# Patient Record
(demographics unavailable — no encounter records)

---

## 2025-02-04 NOTE — HISTORY OF PRESENT ILLNESS
[de-identified] : Ms. Roberts is a 79 y/o F with a PMHx of HTN, HLD and hypothyroidism who presents to the clinic for followup visit. Pt reports to have had a GI virus last week on 1/27/25. Woke up in AM with 4x episodes of diarrhea that was soft, loose, and brown with 4x episodes of vomiting. Pt states her spouse had similar sx which self resolved that day. Pt also endorses a history of "thigh weakness" which pt develops when pt believes her hypothyroidism is uncontrolled. Pt states she has had hypothyroidism since 1984 and recognizes her thigh weakness as one of the symptoms she experiences when her Synthroid dose is too low. Apart from this, pt denies any sx at this time including fevers, fatigue, chills, n/v/d, abdominal pain, dysuria, hematuria, hematochezia, melena. Pt has questions related to her medications.   On first comprehensive visit with us at [facility] in 6/2024, pt was discontinued on her Coreg 12.5mg Qd dosing in favor of 6.25mg BID dosing which pt reports compliance to. She was wondering if she may go back to her once daily dosing citing easier compliance to once daily dosing. Pt was also wondering if her Synthroid dosing may need to be increased back to her 125mcg dosing which she has been taking up until 2 years ago when it was decreased to 100mcg daily. She would also like refills on her combination Losartan-HCTZ tablet at this time. UTD with mammogram (due 12/2025 or 1/2026) and Flu shot.

## 2025-02-04 NOTE — PLAN
[FreeTextEntry1] : - Discussed with Dr. Edwards  - See plan under Hypothyroidism for discussion on dosing adjustment of Synthroid  - F/u with us in 8 weeks

## 2025-02-04 NOTE — ASSESSMENT
[FreeTextEntry1] : Ms. Roberts is a 79 y/o F with a PMHx of HTN, HLD and hypothyroidism who presents to the clinic for followup visit

## 2025-02-04 NOTE — END OF VISIT
[] : Resident [FreeTextEntry3] : Hypothyroid with some recognizable symptoms as per pt. Check TSH. Adjust meds according to level.  If TSH is still within the ref range, can still adjust the dosing by suppressing TSH to the LOWER limit, i.e. increase the LT4 dosing slightly.  Pt verbalized understanding of the plan.

## 2025-02-04 NOTE — HISTORY OF PRESENT ILLNESS
[de-identified] : Ms. Roberts is a 79 y/o F with a PMHx of HTN, HLD and hypothyroidism who presents to the clinic for followup visit. Pt reports to have had a GI virus last week on 1/27/25. Woke up in AM with 4x episodes of diarrhea that was soft, loose, and brown with 4x episodes of vomiting. Pt states her spouse had similar sx which self resolved that day. Pt also endorses a history of "thigh weakness" which pt develops when pt believes her hypothyroidism is uncontrolled. Pt states she has had hypothyroidism since 1984 and recognizes her thigh weakness as one of the symptoms she experiences when her Synthroid dose is too low. Apart from this, pt denies any sx at this time including fevers, fatigue, chills, n/v/d, abdominal pain, dysuria, hematuria, hematochezia, melena. Pt has questions related to her medications.   On first comprehensive visit with us at [facility] in 6/2024, pt was discontinued on her Coreg 12.5mg Qd dosing in favor of 6.25mg BID dosing which pt reports compliance to. She was wondering if she may go back to her once daily dosing citing easier compliance to once daily dosing. Pt was also wondering if her Synthroid dosing may need to be increased back to her 125mcg dosing which she has been taking up until 2 years ago when it was decreased to 100mcg daily. She would also like refills on her combination Losartan-HCTZ tablet at this time. UTD with mammogram (due 12/2025 or 1/2026) and Flu shot.

## 2025-04-07 NOTE — HISTORY OF PRESENT ILLNESS
[FreeTextEntry1] : Follow up visit on blood work [de-identified] : Ms. Roberts is a 79 y/o F with a PMHx of HTN, HLD and hypothyroidism who presents to the clinic for follow-up visit. Reports that since she increased levothyroxine from 100 mcg to 112 mcg her energy levels have improved greatly. She also had myalgias with the lower dose that have since resolved. Separately at her last visit she was prescribed toprol as prior to this she was taking coreg only once a day even though it is a BID medication. Shared that while this beta blocker is only once a day, she had a lot of palpitations and unusual awareness of her heart beat with the toprol so she switched back to coreg 12.5 QD which she still had at home. Today she wanted to discuss appropriate step for BP management in light of her intolerance of toprol and preference for coreg.

## 2025-04-07 NOTE — HISTORY OF PRESENT ILLNESS
[FreeTextEntry1] : Follow up visit on blood work [de-identified] : Ms. Roberts is a 77 y/o F with a PMHx of HTN, HLD and hypothyroidism who presents to the clinic for follow-up visit. Reports that since she increased levothyroxine from 100 mcg to 112 mcg her energy levels have improved greatly. She also had myalgias with the lower dose that have since resolved. Separately at her last visit she was prescribed toprol as prior to this she was taking coreg only once a day even though it is a BID medication. Shared that while this beta blocker is only once a day, she had a lot of palpitations and unusual awareness of her heart beat with the toprol so she switched back to coreg 12.5 QD which she still had at home. Today she wanted to discuss appropriate step for BP management in light of her intolerance of toprol and preference for coreg.

## 2025-04-07 NOTE — REVIEW OF SYSTEMS
[Fever] : no fever [Chills] : no chills [Night Sweats] : no night sweats [Recent Change In Weight] : ~T no recent weight change [Chest Pain] : no chest pain [Abdominal Pain] : no abdominal pain [Nausea] : no nausea [Constipation] : no constipation [Diarrhea] : diarrhea [Vomiting] : no vomiting [Heartburn] : no heartburn [Dysuria] : no dysuria [Muscle Pain] : no muscle pain [FreeTextEntry5] : palpitations with toprol that resolved after stopping metoprolol and switching back to coreg  [FreeTextEntry7] : has normal bowel movements every other day [FreeTextEntry9] : improvement in myalgia after increase in synthroid dose

## 2025-07-24 NOTE — REVIEW OF SYSTEMS
[Fever] : no fever [Chills] : no chills [Recent Change In Weight] : ~T no recent weight change [Discharge] : no discharge [Vision Problems] : no vision problems [Chest Pain] : no chest pain [Palpitations] : no palpitations [Lower Ext Edema] : no lower extremity edema [Shortness Of Breath] : no shortness of breath [Cough] : no cough [Abdominal Pain] : no abdominal pain [Nausea] : no nausea [Vomiting] : no vomiting [Dysuria] : no dysuria [Back Pain] : no back pain [Itching] : no itching [Skin Rash] : no skin rash [Headache] : no headache [Memory Loss] : no memory loss [Suicidal] : not suicidal

## 2025-07-24 NOTE — END OF VISIT
[] : Resident [FreeTextEntry3] : Patient is here for AWV and follow-up of hypertension hyperlipidemia and hypothyroidism.  Blood pressure is well-controlled and will continue current medications.  She is up-to-date on colorectal cancer screening.  She refuses shingles.  She is due for a tetanus vaccine and we have referred her to her pharmacy for this.  In the past she has refused statins which were recommended because of intermediate ASCVD risk score.  Patient is agreeable to calcium score.  Will order. Will also check labs incl TSH

## 2025-07-24 NOTE — HEALTH RISK ASSESSMENT
[Little interest or pleasure doing things] : 1) Little interest or pleasure doing things [Feeling down, depressed, or hopeless] : 2) Feeling down, depressed, or hopeless [0] : 2) Feeling down, depressed, or hopeless: Not at all (0) [PHQ-9 Negative - No further assessment needed] : PHQ-9 Negative - No further assessment needed [Never] : Never [No] : In the past 12 months have you used drugs other than those required for medical reasons? No [No falls in past year] : Patient reported no falls in the past year [Patient reported mammogram was normal] : Patient reported mammogram was normal [Patient reported PAP Smear was normal] : Patient reported PAP Smear was normal [Patient reported bone density results were normal] : Patient reported bone density results were normal [Patient reported colonoscopy was normal] : Patient reported colonoscopy was normal [With Significant Other] : lives with significant other [With Family] : lives with family [# of Members in Household ___] :  household currently consist of [unfilled] member(s) [Retired] : retired [] :  [# Of Children ___] : has [unfilled] children [Feels Safe at Home] : Feels safe at home [Fully functional (bathing, dressing, toileting, transferring, walking, feeding)] : Fully functional (bathing, dressing, toileting, transferring, walking, feeding) [Fully functional (using the telephone, shopping, preparing meals, housekeeping, doing laundry, using] : Fully functional and needs no help or supervision to perform IADLs (using the telephone, shopping, preparing meals, housekeeping, doing laundry, using transportation, managing medications and managing finances) [Reports normal functional visual acuity (ie: able to read med bottle)] : Reports normal functional visual acuity [With Patient/Caregiver] : , with patient/caregiver [I will adhere to the patient's wishes.] : I will adhere to the patient's wishes. [Good] : ~his/her~ current health as good [Audit-CScore] : 0 [[facility]] : 12 [[id number]] : 0 [Yes] : Reviewed medication list for presence of high-risk medications. 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

## 2025-07-24 NOTE — HISTORY OF PRESENT ILLNESS
[de-identified] : Ms. Roberts is a 77 y/o F with a PMHx of HTN, HLD and hypothyroidism who presents to the clinic for CPE. Reports she has been on stable dose of her levothyroxine without any increased fatigue. Reports she is still taking coreg 6.25 BID as well as losartan HCTZ 50-12.5 without any recent palpitations. Reports feeling well overall. Denies fevers, chills, weight loss, night sweats, chest pain, palpitations, abdominal, N/V/D.

## 2025-07-24 NOTE — HISTORY OF PRESENT ILLNESS
[de-identified] : Ms. Roberts is a 79 y/o F with a PMHx of HTN, HLD and hypothyroidism who presents to the clinic for CPE. Reports she has been on stable dose of her levothyroxine without any increased fatigue. Reports she is still taking coreg 6.25 BID as well as losartan HCTZ 50-12.5 without any recent palpitations. Reports feeling well overall. Denies fevers, chills, weight loss, night sweats, chest pain, palpitations, abdominal, N/V/D.

## 2025-07-24 NOTE — ASSESSMENT
[FreeTextEntry1] : HCM -Family hx of breast CA in mother who lived to her 90's, requesting to continue mammogram studies and wants GYN referral -Last DEXA >10 years ago, unsure of results. Check today -No longer opting for C-scope (which would be due in 2029), but agreeable for yearly FOBT testing -Will go to pharmacy for tetanus, declining shingles vax

## 2025-07-24 NOTE — HISTORY OF PRESENT ILLNESS
[de-identified] : Ms. Roberts is a 77 y/o F with a PMHx of HTN, HLD and hypothyroidism who presents to the clinic for CPE. Reports she has been on stable dose of her levothyroxine without any increased fatigue. Reports she is still taking coreg 6.25 BID as well as losartan HCTZ 50-12.5 without any recent palpitations. Reports feeling well overall. Denies fevers, chills, weight loss, night sweats, chest pain, palpitations, abdominal, N/V/D.

## 2025-07-24 NOTE — HEALTH RISK ASSESSMENT
[Little interest or pleasure doing things] : 1) Little interest or pleasure doing things [Feeling down, depressed, or hopeless] : 2) Feeling down, depressed, or hopeless [0] : 2) Feeling down, depressed, or hopeless: Not at all (0) [PHQ-9 Negative - No further assessment needed] : PHQ-9 Negative - No further assessment needed [Never] : Never [No] : In the past 12 months have you used drugs other than those required for medical reasons? No [No falls in past year] : Patient reported no falls in the past year [Patient reported mammogram was normal] : Patient reported mammogram was normal [Patient reported PAP Smear was normal] : Patient reported PAP Smear was normal [Patient reported bone density results were normal] : Patient reported bone density results were normal [Patient reported colonoscopy was normal] : Patient reported colonoscopy was normal [With Significant Other] : lives with significant other [With Family] : lives with family [# of Members in Household ___] :  household currently consist of [unfilled] member(s) [Retired] : retired [] :  [# Of Children ___] : has [unfilled] children [Feels Safe at Home] : Feels safe at home [Fully functional (bathing, dressing, toileting, transferring, walking, feeding)] : Fully functional (bathing, dressing, toileting, transferring, walking, feeding) [Fully functional (using the telephone, shopping, preparing meals, housekeeping, doing laundry, using] : Fully functional and needs no help or supervision to perform IADLs (using the telephone, shopping, preparing meals, housekeeping, doing laundry, using transportation, managing medications and managing finances) [Reports normal functional visual acuity (ie: able to read med bottle)] : Reports normal functional visual acuity [With Patient/Caregiver] : , with patient/caregiver [I will adhere to the patient's wishes.] : I will adhere to the patient's wishes. [Good] : ~his/her~ current health as good [Audit-CScore] : 0 [[facility]] : 12 [[id number]] : 0 [Yes] : Reviewed medication list for presence of high-risk medications. 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

## 2025-07-24 NOTE — PHYSICAL EXAM
[No Acute Distress] : no acute distress [Well Nourished] : well nourished [Normal Sclera/Conjunctiva] : normal sclera/conjunctiva [EOMI] : extraocular movements intact [Normal Oropharynx] : the oropharynx was normal [No Respiratory Distress] : no respiratory distress  [Clear to Auscultation] : lungs were clear to auscultation bilaterally [Normal Rate] : normal rate  [Soft] : abdomen soft [Non Tender] : non-tender [No Joint Swelling] : no joint swelling [Grossly Normal Strength/Tone] : grossly normal strength/tone [Coordination Grossly Intact] : coordination grossly intact [No Focal Deficits] : no focal deficits [___/1] : [unfilled]/1   [___/3] : [unfilled]/3 [___/5] : [unfilled]/5 [___/4] : [unfilled]/4 [___/2] : [unfilled]/2 [___/8] : [unfilled]/8 [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

## 2025-07-24 NOTE — HEALTH RISK ASSESSMENT
[Little interest or pleasure doing things] : 1) Little interest or pleasure doing things [Feeling down, depressed, or hopeless] : 2) Feeling down, depressed, or hopeless [0] : 2) Feeling down, depressed, or hopeless: Not at all (0) [PHQ-9 Negative - No further assessment needed] : PHQ-9 Negative - No further assessment needed [Never] : Never [No] : In the past 12 months have you used drugs other than those required for medical reasons? No [No falls in past year] : Patient reported no falls in the past year [Patient reported mammogram was normal] : Patient reported mammogram was normal [Patient reported PAP Smear was normal] : Patient reported PAP Smear was normal [Patient reported bone density results were normal] : Patient reported bone density results were normal [Patient reported colonoscopy was normal] : Patient reported colonoscopy was normal [With Significant Other] : lives with significant other [With Family] : lives with family [# of Members in Household ___] :  household currently consist of [unfilled] member(s) [Retired] : retired [] :  [# Of Children ___] : has [unfilled] children [Feels Safe at Home] : Feels safe at home [Fully functional (bathing, dressing, toileting, transferring, walking, feeding)] : Fully functional (bathing, dressing, toileting, transferring, walking, feeding) [Fully functional (using the telephone, shopping, preparing meals, housekeeping, doing laundry, using] : Fully functional and needs no help or supervision to perform IADLs (using the telephone, shopping, preparing meals, housekeeping, doing laundry, using transportation, managing medications and managing finances) [Reports normal functional visual acuity (ie: able to read med bottle)] : Reports normal functional visual acuity [With Patient/Caregiver] : , with patient/caregiver [I will adhere to the patient's wishes.] : I will adhere to the patient's wishes. [Good] : ~his/her~ current health as good [Audit-CScore] : 0 [[facility]] : 12 [[id number]] : 0 [Yes] : Reviewed medication list for presence of high-risk medications. 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